# Patient Record
Sex: MALE | Race: WHITE | NOT HISPANIC OR LATINO | Employment: OTHER | URBAN - METROPOLITAN AREA
[De-identification: names, ages, dates, MRNs, and addresses within clinical notes are randomized per-mention and may not be internally consistent; named-entity substitution may affect disease eponyms.]

---

## 2021-04-12 ENCOUNTER — HOSPITAL ENCOUNTER (EMERGENCY)
Facility: HOSPITAL | Age: 61
End: 2021-04-12
Attending: EMERGENCY MEDICINE
Payer: COMMERCIAL

## 2021-04-12 VITALS
HEART RATE: 58 BPM | RESPIRATION RATE: 19 BRPM | OXYGEN SATURATION: 95 % | DIASTOLIC BLOOD PRESSURE: 95 MMHG | TEMPERATURE: 98 F | SYSTOLIC BLOOD PRESSURE: 145 MMHG | WEIGHT: 240.31 LBS

## 2021-04-12 DIAGNOSIS — S12.601A CLOSED NONDISPLACED FRACTURE OF SEVENTH CERVICAL VERTEBRA, UNSPECIFIED FRACTURE MORPHOLOGY, INITIAL ENCOUNTER: ICD-10-CM

## 2021-04-12 DIAGNOSIS — S01.01XA LACERATION OF SCALP, INITIAL ENCOUNTER: ICD-10-CM

## 2021-04-12 DIAGNOSIS — S20.219A CONTUSION OF CHEST WALL, UNSPECIFIED LATERALITY, INITIAL ENCOUNTER: ICD-10-CM
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
## 2021-04-12 DIAGNOSIS — G93.89 PNEUMOCEPHALUS: Primary | ICD-10-CM

## 2021-04-12 DIAGNOSIS — H91.91 HEARING LOSS OF RIGHT EAR, UNSPECIFIED HEARING LOSS TYPE: ICD-10-CM

## 2021-04-12 LAB
ALBUMIN SERPL BCP-MCNC: 3.2 G/DL (ref 3.5–5.2)
ALP SERPL-CCNC: 45 U/L (ref 55–135)
ALT SERPL W/O P-5'-P-CCNC: 34 U/L (ref 10–44)
ANION GAP SERPL CALC-SCNC: 8 MMOL/L (ref 8–16)
APTT BLDCRRT: 24.8 SEC (ref 21–32)
AST SERPL-CCNC: 25 U/L (ref 10–40)
BASOPHILS # BLD AUTO: 0.03 K/UL (ref 0–0.2)
BASOPHILS NFR BLD: 0.4 % (ref 0–1.9)
BILIRUB SERPL-MCNC: 0.6 MG/DL (ref 0.1–1)
BUN SERPL-MCNC: 24 MG/DL (ref 8–23)
CALCIUM SERPL-MCNC: 8.6 MG/DL (ref 8.7–10.5)
CHLORIDE SERPL-SCNC: 106 MMOL/L (ref 95–110)
CK SERPL-CCNC: 394 U/L (ref 20–200)
CO2 SERPL-SCNC: 26 MMOL/L (ref 23–29)
CREAT SERPL-MCNC: 1.1 MG/DL (ref 0.5–1.4)
CTP QC/QA: YES
DIFFERENTIAL METHOD: NORMAL
EOSINOPHIL # BLD AUTO: 0.1 K/UL (ref 0–0.5)
EOSINOPHIL NFR BLD: 2 % (ref 0–8)
ERYTHROCYTE [DISTWIDTH] IN BLOOD BY AUTOMATED COUNT: 13.1 % (ref 11.5–14.5)
EST. GFR  (AFRICAN AMERICAN): >60 ML/MIN/1.73 M^2
EST. GFR  (NON AFRICAN AMERICAN): >60 ML/MIN/1.73 M^2
GLUCOSE SERPL-MCNC: 123 MG/DL (ref 70–110)
HCT VFR BLD AUTO: 50.5 % (ref 40–54)
HGB BLD-MCNC: 16.6 G/DL (ref 14–18)
IMM GRANULOCYTES # BLD AUTO: 0.02 K/UL (ref 0–0.04)
IMM GRANULOCYTES NFR BLD AUTO: 0.3 % (ref 0–0.5)
INR PPP: 1 (ref 0.8–1.2)
LYMPHOCYTES # BLD AUTO: 1.6 K/UL (ref 1–4.8)
LYMPHOCYTES NFR BLD: 23 % (ref 18–48)
MCH RBC QN AUTO: 30.4 PG (ref 27–31)
MCHC RBC AUTO-ENTMCNC: 32.9 G/DL (ref 32–36)
MCV RBC AUTO: 93 FL (ref 82–98)
MONOCYTES # BLD AUTO: 0.5 K/UL (ref 0.3–1)
MONOCYTES NFR BLD: 7.4 % (ref 4–15)
NEUTROPHILS # BLD AUTO: 4.7 K/UL (ref 1.8–7.7)
NEUTROPHILS NFR BLD: 66.9 % (ref 38–73)
NRBC BLD-RTO: 0 /100 WBC
PLATELET # BLD AUTO: 208 K/UL (ref 150–450)
PMV BLD AUTO: 9.6 FL (ref 9.2–12.9)
POTASSIUM SERPL-SCNC: 4.7 MMOL/L (ref 3.5–5.1)
PROT SERPL-MCNC: 6.5 G/DL (ref 6–8.4)
PROTHROMBIN TIME: 10.3 SEC (ref 9–12.5)
RBC # BLD AUTO: 5.46 M/UL (ref 4.6–6.2)
SARS-COV-2 RDRP RESP QL NAA+PROBE: NEGATIVE
SODIUM SERPL-SCNC: 140 MMOL/L (ref 136–145)
TROPONIN I SERPL DL<=0.01 NG/ML-MCNC: 0.02 NG/ML (ref 0–0.03)
WBC # BLD AUTO: 7.03 K/UL (ref 3.9–12.7)

## 2021-04-12 PROCEDURE — 80053 COMPREHEN METABOLIC PANEL: CPT | Mod: ER | Performed by: EMERGENCY MEDICINE

## 2021-04-12 PROCEDURE — 85025 COMPLETE CBC W/AUTO DIFF WBC: CPT | Mod: ER | Performed by: EMERGENCY MEDICINE

## 2021-04-12 PROCEDURE — 85730 THROMBOPLASTIN TIME PARTIAL: CPT | Mod: ER | Performed by: EMERGENCY MEDICINE

## 2021-04-12 PROCEDURE — 96374 THER/PROPH/DIAG INJ IV PUSH: CPT | Mod: ER

## 2021-04-12 PROCEDURE — 99291 CRITICAL CARE FIRST HOUR: CPT | Mod: 25,ER

## 2021-04-12 PROCEDURE — 85610 PROTHROMBIN TIME: CPT | Mod: ER | Performed by: EMERGENCY MEDICINE

## 2021-04-12 PROCEDURE — 84484 ASSAY OF TROPONIN QUANT: CPT | Mod: ER | Performed by: EMERGENCY MEDICINE

## 2021-04-12 PROCEDURE — 96361 HYDRATE IV INFUSION ADD-ON: CPT | Mod: ER

## 2021-04-12 PROCEDURE — U0002 COVID-19 LAB TEST NON-CDC: HCPCS | Mod: ER | Performed by: EMERGENCY MEDICINE

## 2021-04-12 PROCEDURE — 63600175 PHARM REV CODE 636 W HCPCS: Mod: ER | Performed by: EMERGENCY MEDICINE

## 2021-04-12 PROCEDURE — 82550 ASSAY OF CK (CPK): CPT | Mod: ER | Performed by: EMERGENCY MEDICINE

## 2021-04-12 RX ORDER — ONDANSETRON 2 MG/ML
4 INJECTION INTRAMUSCULAR; INTRAVENOUS
Status: COMPLETED | OUTPATIENT
Start: 2021-04-12 | End: 2021-04-12

## 2021-04-12 RX ORDER — EVOLOCUMAB 140 MG/ML
140 INJECTION, SOLUTION SUBCUTANEOUS
COMMUNITY
Start: 2021-03-24

## 2021-04-12 RX ORDER — IPRATROPIUM BROMIDE 21 UG/1
2 SPRAY, METERED NASAL 2 TIMES DAILY
COMMUNITY
Start: 2021-03-26

## 2021-04-12 RX ORDER — TADALAFIL 5 MG/1
5 TABLET ORAL NIGHTLY
COMMUNITY
Start: 2021-03-24

## 2021-04-12 RX ORDER — OMEPRAZOLE 20 MG/1
20 CAPSULE, DELAYED RELEASE ORAL DAILY
COMMUNITY
Start: 2021-03-24

## 2021-04-12 RX ORDER — LISINOPRIL AND HYDROCHLOROTHIAZIDE 12.5; 2 MG/1; MG/1
1 TABLET ORAL
COMMUNITY
End: 2021-04-12 | Stop reason: CLARIF

## 2021-04-12 RX ORDER — HYDROCODONE BITARTRATE AND ACETAMINOPHEN 10; 325 MG/1; MG/1
1 TABLET ORAL 3 TIMES DAILY PRN
COMMUNITY
Start: 2021-04-11

## 2021-04-12 RX ADMIN — SODIUM CHLORIDE, SODIUM LACTATE, POTASSIUM CHLORIDE, AND CALCIUM CHLORIDE 1000 ML: .6; .31; .03; .02 INJECTION, SOLUTION INTRAVENOUS at 10:04

## 2021-04-12 RX ADMIN — ONDANSETRON 4 MG: 2 INJECTION INTRAMUSCULAR; INTRAVENOUS at 11:04

## 2021-09-30 ENCOUNTER — HOSPITAL ENCOUNTER (EMERGENCY)
Facility: HOSPITAL | Age: 61
Discharge: HOME | End: 2021-09-30
Attending: EMERGENCY MEDICINE
Payer: COMMERCIAL

## 2021-09-30 VITALS
RESPIRATION RATE: 28 BRPM | DIASTOLIC BLOOD PRESSURE: 86 MMHG | WEIGHT: 245.81 LBS | HEART RATE: 62 BPM | TEMPERATURE: 99 F | OXYGEN SATURATION: 96 % | SYSTOLIC BLOOD PRESSURE: 130 MMHG

## 2021-09-30 DIAGNOSIS — R06.02 SOB (SHORTNESS OF BREATH): ICD-10-CM

## 2021-09-30 DIAGNOSIS — R60.9 DEPENDENT EDEMA: Primary | ICD-10-CM

## 2021-09-30 LAB
ALBUMIN SERPL BCP-MCNC: 3.2 G/DL (ref 3.5–5.2)
ALP SERPL-CCNC: 48 U/L (ref 55–135)
ALT SERPL W/O P-5'-P-CCNC: 88 U/L (ref 10–44)
ANION GAP SERPL CALC-SCNC: 11 MMOL/L (ref 8–16)
AST SERPL-CCNC: 45 U/L (ref 10–40)
BASOPHILS # BLD AUTO: 0.05 K/UL (ref 0–0.2)
BASOPHILS NFR BLD: 0.7 % (ref 0–1.9)
BILIRUB SERPL-MCNC: 0.8 MG/DL (ref 0.1–1)
BILIRUB UR QL STRIP: NEGATIVE
BNP SERPL-MCNC: 80 PG/ML (ref 0–99)
BUN SERPL-MCNC: 21 MG/DL (ref 8–23)
CALCIUM SERPL-MCNC: 9.1 MG/DL (ref 8.7–10.5)
CHLORIDE SERPL-SCNC: 104 MMOL/L (ref 95–110)
CK SERPL-CCNC: 199 U/L (ref 20–200)
CLARITY UR REFRACT.AUTO: CLEAR
CO2 SERPL-SCNC: 26 MMOL/L (ref 23–29)
COLOR UR AUTO: YELLOW
CREAT SERPL-MCNC: 1.3 MG/DL (ref 0.5–1.4)
DIFFERENTIAL METHOD: NORMAL
EOSINOPHIL # BLD AUTO: 0.3 K/UL (ref 0–0.5)
EOSINOPHIL NFR BLD: 3.7 % (ref 0–8)
ERYTHROCYTE [DISTWIDTH] IN BLOOD BY AUTOMATED COUNT: 13.1 % (ref 11.5–14.5)
EST. GFR  (AFRICAN AMERICAN): >60 ML/MIN/1.73 M^2
EST. GFR  (NON AFRICAN AMERICAN): 58.1 ML/MIN/1.73 M^2
GLUCOSE SERPL-MCNC: 99 MG/DL (ref 70–110)
GLUCOSE UR QL STRIP: NEGATIVE
HCT VFR BLD AUTO: 53.7 % (ref 40–54)
HGB BLD-MCNC: 18 G/DL (ref 14–18)
HGB UR QL STRIP: NEGATIVE
IMM GRANULOCYTES # BLD AUTO: 0.03 K/UL (ref 0–0.04)
IMM GRANULOCYTES NFR BLD AUTO: 0.4 % (ref 0–0.5)
KETONES UR QL STRIP: NEGATIVE
LEUKOCYTE ESTERASE UR QL STRIP: NEGATIVE
LYMPHOCYTES # BLD AUTO: 2.2 K/UL (ref 1–4.8)
LYMPHOCYTES NFR BLD: 32.5 % (ref 18–48)
MCH RBC QN AUTO: 30.2 PG (ref 27–31)
MCHC RBC AUTO-ENTMCNC: 33.5 G/DL (ref 32–36)
MCV RBC AUTO: 90 FL (ref 82–98)
MONOCYTES # BLD AUTO: 0.6 K/UL (ref 0.3–1)
MONOCYTES NFR BLD: 8.8 % (ref 4–15)
NEUTROPHILS # BLD AUTO: 3.6 K/UL (ref 1.8–7.7)
NEUTROPHILS NFR BLD: 53.9 % (ref 38–73)
NITRITE UR QL STRIP: NEGATIVE
NRBC BLD-RTO: 0 /100 WBC
PH UR STRIP: 6 [PH] (ref 5–8)
PLATELET # BLD AUTO: 221 K/UL (ref 150–450)
PMV BLD AUTO: 9.6 FL (ref 9.2–12.9)
POTASSIUM SERPL-SCNC: 4.7 MMOL/L (ref 3.5–5.1)
PROT SERPL-MCNC: 7 G/DL (ref 6–8.4)
PROT UR QL STRIP: NEGATIVE
RBC # BLD AUTO: 5.97 M/UL (ref 4.6–6.2)
SODIUM SERPL-SCNC: 141 MMOL/L (ref 136–145)
SP GR UR STRIP: <=1.005 (ref 1–1.03)
TROPONIN I SERPL DL<=0.01 NG/ML-MCNC: 0.01 NG/ML (ref 0–0.03)
URN SPEC COLLECT METH UR: ABNORMAL
UROBILINOGEN UR STRIP-ACNC: NEGATIVE EU/DL
WBC # BLD AUTO: 6.7 K/UL (ref 3.9–12.7)

## 2021-09-30 PROCEDURE — 80053 COMPREHEN METABOLIC PANEL: CPT | Mod: ER | Performed by: EMERGENCY MEDICINE

## 2021-09-30 PROCEDURE — 93005 ELECTROCARDIOGRAM TRACING: CPT | Mod: ER

## 2021-09-30 PROCEDURE — 93010 EKG 12-LEAD: ICD-10-PCS | Mod: ,,, | Performed by: INTERNAL MEDICINE

## 2021-09-30 PROCEDURE — 81003 URINALYSIS AUTO W/O SCOPE: CPT | Mod: ER | Performed by: EMERGENCY MEDICINE

## 2021-09-30 PROCEDURE — 99285 EMERGENCY DEPT VISIT HI MDM: CPT | Mod: 25,ER

## 2021-09-30 PROCEDURE — 84484 ASSAY OF TROPONIN QUANT: CPT | Mod: ER | Performed by: EMERGENCY MEDICINE

## 2021-09-30 PROCEDURE — 93010 ELECTROCARDIOGRAM REPORT: CPT | Mod: ,,, | Performed by: INTERNAL MEDICINE

## 2021-09-30 PROCEDURE — 82550 ASSAY OF CK (CPK): CPT | Mod: ER | Performed by: EMERGENCY MEDICINE

## 2021-09-30 PROCEDURE — 83880 ASSAY OF NATRIURETIC PEPTIDE: CPT | Mod: ER | Performed by: EMERGENCY MEDICINE

## 2021-09-30 PROCEDURE — 25500020 PHARM REV CODE 255: Mod: ER | Performed by: EMERGENCY MEDICINE

## 2021-09-30 PROCEDURE — 85025 COMPLETE CBC W/AUTO DIFF WBC: CPT | Mod: ER | Performed by: EMERGENCY MEDICINE

## 2021-09-30 RX ORDER — ROSUVASTATIN CALCIUM 40 MG/1
10 TABLET, COATED ORAL NIGHTLY
COMMUNITY

## 2021-09-30 RX ADMIN — IOHEXOL 100 ML: 350 INJECTION, SOLUTION INTRAVENOUS at 06:09

## 2024-04-17 ENCOUNTER — OFFICE VISIT (OUTPATIENT)
Dept: OPHTHALMOLOGY | Facility: CLINIC | Age: 64
End: 2024-04-17
Payer: MEDICARE

## 2024-04-17 DIAGNOSIS — G43.109 OCULAR MIGRAINE: ICD-10-CM

## 2024-04-17 DIAGNOSIS — H52.13 MYOPIA WITH PRESBYOPIA, BILATERAL: ICD-10-CM

## 2024-04-17 DIAGNOSIS — H25.13 NUCLEAR SCLEROSIS, BILATERAL: Primary | ICD-10-CM

## 2024-04-17 DIAGNOSIS — H53.10 SUBJECTIVE VISUAL DISTURBANCE: ICD-10-CM

## 2024-04-17 DIAGNOSIS — H52.221 REGULAR ASTIGMATISM OF RIGHT EYE: ICD-10-CM

## 2024-04-17 DIAGNOSIS — H52.4 MYOPIA WITH PRESBYOPIA, BILATERAL: ICD-10-CM

## 2024-04-17 DIAGNOSIS — Z98.890 HX OF LASIK: ICD-10-CM

## 2024-04-17 PROCEDURE — 92004 COMPRE OPH EXAM NEW PT 1/>: CPT | Mod: S$GLB,,, | Performed by: OPTOMETRIST

## 2024-04-17 PROCEDURE — 99999 PR PBB SHADOW E&M-EST. PATIENT-LVL II: CPT | Mod: PBBFAC,,, | Performed by: OPTOMETRIST

## 2024-04-17 PROCEDURE — 1159F MED LIST DOCD IN RCRD: CPT | Mod: CPTII,S$GLB,, | Performed by: OPTOMETRIST

## 2024-04-17 PROCEDURE — 92015 DETERMINE REFRACTIVE STATE: CPT | Mod: S$GLB,,, | Performed by: OPTOMETRIST

## 2024-04-17 PROCEDURE — 1160F RVW MEDS BY RX/DR IN RCRD: CPT | Mod: CPTII,S$GLB,, | Performed by: OPTOMETRIST
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
## 2024-05-06 ENCOUNTER — OFFICE VISIT (OUTPATIENT)
Dept: OPHTHALMOLOGY | Facility: CLINIC | Age: 64
End: 2024-05-06
Payer: MEDICARE

## 2024-05-06 DIAGNOSIS — G43.109 OCULAR MIGRAINE: Primary | ICD-10-CM

## 2024-05-06 DIAGNOSIS — H53.10 SUBJECTIVE VISUAL DISTURBANCE: ICD-10-CM

## 2024-05-06 PROCEDURE — 92083 EXTENDED VISUAL FIELD XM: CPT | Mod: S$GLB,,, | Performed by: OPTOMETRIST

## 2024-05-06 PROCEDURE — 99499 UNLISTED E&M SERVICE: CPT | Mod: S$GLB,,, | Performed by: OPTOMETRIST

## 2024-05-06 PROCEDURE — 99999 PR PBB SHADOW E&M-EST. PATIENT-LVL II: CPT | Mod: PBBFAC,,, | Performed by: OPTOMETRIST

## 2024-05-06 NOTE — PROGRESS NOTES
HPI     Follow-up            Comments: RTC next available for 24-2 HVF  Pt states no change in VA         Last edited by Holli Gil on 5/6/2024 10:33 AM.        no aura or new symptoms since last visit    Assessment /Plan     For exam results, see Encounter Report.    Ocular migraine  -     Toth Visual Field - OU - Extended - Both Eyes    Subjective visual disturbance  -     Toth Visual Field - OU - Extended - Both Eyes      HVF normal with good reliability   Discussed Neurology referral if symptoms increase in freq     RTC 1 yr for dilated eye exam or PRN if any problems.   Discussed above and answered questions.

## 2024-06-11 DIAGNOSIS — Z00.6 RESEARCH STUDY PATIENT: Primary | ICD-10-CM

## 2024-06-13 ENCOUNTER — RESEARCH ENCOUNTER (OUTPATIENT)
Dept: RESEARCH | Facility: HOSPITAL | Age: 64
End: 2024-06-13
Payer: MEDICARE

## 2024-06-13 ENCOUNTER — LAB VISIT (OUTPATIENT)
Dept: LAB | Facility: HOSPITAL | Age: 64
End: 2024-06-13
Attending: INTERNAL MEDICINE
Payer: MEDICARE

## 2024-06-13 DIAGNOSIS — Z00.6 RESEARCH STUDY PATIENT: ICD-10-CM

## 2024-06-13 LAB
DRUG STUDY TEST NAME: NORMAL
DRUG STUDY TEST RESULT: NORMAL

## 2024-06-13 PROCEDURE — 36415 COLL VENOUS BLD VENIPUNCTURE: CPT | Performed by: INTERNAL MEDICINE

## 2024-06-13 NOTE — RESEARCH
Sponsor: [employer]     Study Title/IRB Number: [id number]    Principle Investigator: Dr. Benson Ramirez    Patient eligibility was checked prior to enrollment in the study. Patient met the following inclusion and exclusion criteria:     INCLUSION CRITERIA  Participant age is 50 years or older at the time of signing the Informed Consent form  Participant is capable of giving signed Informed Consent, which includes compliance with the requirements and restrictions in the informed consent form and the protocol    EXCLUSION CRITERIA  Participant is undergoing or referred for diagnostic evaluation due to clinical suspicion for cancer  Participant has a personal history of invasive or hematologic malignancy, diagnosed within the last 3 years prior to expected enrollment date or diagnosed greater than 3 years prior to expected enrollment date and never treated  Participant has had definitive treatment for invasive or hematologic malignancy within the 3 years prior to expected enrollment date  Participant is not able to comply with protocol procedures  Participant is not a current patient at a participating center  Participant is currently enrolled or was previously enrolled in another [employer]-sponsored study  Participant is current or previous employee/contractor of [employer]  Participant is currently pregnant (by participant's self-report of pregnancy status)    Prior to the Informed Consent (IC) being signed, or any study protocol required data collection, testing, procedure, or intervention being performed, the following was done and/or discussed:  Patient was given a copy of the IC for review   Purpose of the study and qualifications to participate   Study design, Follow up schedule, and tests or procedures done at each visit  Confidentiality and HIPAA Authorization for Release of Medical Records for the research trial/ subject's rights/research related injury  Risk, Benefits, Alternative Treatments, Compensation and  Costs  Participation in the research trial is voluntary and patient may withdraw at anytime  Contact information for study related questions    Patient verbalizes understanding of the above: Yes  Contact information for CRC and PI given to patient: Yes  Patient able to adequately summarize: the purpose of the study, the risks associated with the study, and all procedures, testing, and follow-ups associated with the study: Yes    Patient signed the informed consent form for the research study with an IRB approval date of 4/25/2022. Each page of the consent form was reviewed with patient and all questions answered satisfactorily.   Patient received a copy of the consent form. The original consent was scanned into electronic medical records.    Anthropometric Data    Participant is a 63 y.o., White, Not  or /a, male  Participant height: [id number]     Participant weight: 228      Smoking History and Alcohol use     Please indicate whether the participant smoked at least 100 cigarettes in their lifetime:   Yes  Please indicate whether the participant is a current smoker:  No  Age participant started smoking cigarettes:  16  Age participant stopped smoking cigarettes:  18  During their time as a smoker, please indicate if the participant stopped smoking for a month or more: Not Applicable  Please indicate how many cigarettes per day did the participant smoke on average for the majority of their time as a smoker: Blank single: 20    During the last 12 months, how often did the participant have any kind of drink containing alcohol? Count as one drink a 12 ounce can or glass of beer, a 5 ounce glass of wine, or a drink containing 1 shot of liquid-3  During the last 12 months, how many drinks did the participant have on days when they drank alcohol?2 drinks per day    Blood Draw    Did the participant experience any abnormal signs or symptoms in the 90 day prior to today's blood draw?  No    Abdominal pain  No  Abnormal discharge from nipple No  Abnormal vaginal bleeding or discharge No  Abnormal; heavy or irregular menstruation; spotting No  Abnormal findings of blood chemistry/plasma proteins No  Abnormal immunological findings in serum No  Abnormal serum enzyme levels No  Anemia; iron deficiency anemia No  Blood clot/venous thromboembolism No  Bone pain No  Breast lump No  Changes in bowel habits or bladder function No  Nausea and vomiting No  Severe or recurrent nosebleeds No  Swelling of lymph nodes/enlarged lymph nodes No  Trouble swallowing (dysphagia) No  Ulcers No  Unusual bleeding (stool or urine) No  Unexplained weight loss (cachexia) No  Other N/A    Following IC being signed and prior to blood draw, patient completed all baseline/pretest questionnaires. The following specimens were collected from the pt at the time of this encounter via peripheral blood draw.    Blood draw location: Left Arm  Needle used: 21 gauge butterfly needle  Blood draw amount: 40ml  Blood draw time: 1115 6/13/2024

## 2024-06-28 ENCOUNTER — TELEPHONE (OUTPATIENT)
Dept: RESEARCH | Facility: HOSPITAL | Age: 64
End: 2024-06-28
Payer: MEDICARE

## 2024-06-28 NOTE — TELEPHONE ENCOUNTER
HATTIEIL Patel YOUNG ID-[id number]     Subject called to report results for HATTIEIL Patel Research Project. No answer,   [phone]    message left for subject to call the coordinator back at [phone]. This is the 1st attempt to contact subject.

## 2024-06-28 NOTE — TELEPHONE ENCOUNTER
Maddie Pathfinder 2022.003    Maddie ID: [id number]      Results the Maddie [facility] Multi Cancer Early Detection test and were reviewed by PI Dr Ramirez. Patient was called and notified of test results, there was no signal detected.    Patient reminded, this was a screening test, so we still highly encourage them to continue other normal health screenings  and to continue to adhere to guideline-recommended cancer screening.    Patient was asked about completing 30 day questionnaire online and was agreeable.

## 2024-11-18 ENCOUNTER — OFFICE VISIT (OUTPATIENT)
Facility: CLINIC | Age: 64
End: 2024-11-18
Payer: MEDICARE

## 2024-11-18 ENCOUNTER — LAB VISIT (OUTPATIENT)
Dept: LAB | Facility: HOSPITAL | Age: 64
End: 2024-11-18
Attending: UROLOGY
Payer: MEDICARE

## 2024-11-18 VITALS
WEIGHT: 240 LBS | RESPIRATION RATE: 16 BRPM | HEART RATE: 56 BPM | SYSTOLIC BLOOD PRESSURE: 120 MMHG | HEIGHT: 68 IN | BODY MASS INDEX: 36.37 KG/M2 | DIASTOLIC BLOOD PRESSURE: 81 MMHG

## 2024-11-18 DIAGNOSIS — R53.83 FATIGUE, UNSPECIFIED TYPE: ICD-10-CM

## 2024-11-18 DIAGNOSIS — Z12.5 PROSTATE CANCER SCREENING: ICD-10-CM

## 2024-11-18 DIAGNOSIS — N40.0 BPH WITHOUT URINARY OBSTRUCTION: Primary | ICD-10-CM

## 2024-11-18 DIAGNOSIS — N52.8 OTHER MALE ERECTILE DYSFUNCTION: ICD-10-CM

## 2024-11-18 DIAGNOSIS — N40.0 BPH WITHOUT URINARY OBSTRUCTION: ICD-10-CM

## 2024-11-18 LAB
COMPLEXED PSA SERPL-MCNC: 0.58 NG/ML (ref 0–4)
TESTOST SERPL-MCNC: 402 NG/DL (ref 304–1227)

## 2024-11-18 PROCEDURE — 99214 OFFICE O/P EST MOD 30 MIN: CPT | Mod: S$GLB,,, | Performed by: UROLOGY

## 2024-11-18 PROCEDURE — 1101F PT FALLS ASSESS-DOCD LE1/YR: CPT | Mod: CPTII,S$GLB,, | Performed by: UROLOGY

## 2024-11-18 PROCEDURE — 3074F SYST BP LT 130 MM HG: CPT | Mod: CPTII,S$GLB,, | Performed by: UROLOGY

## 2024-11-18 PROCEDURE — 1126F AMNT PAIN NOTED NONE PRSNT: CPT | Mod: CPTII,S$GLB,, | Performed by: UROLOGY

## 2024-11-18 PROCEDURE — 1159F MED LIST DOCD IN RCRD: CPT | Mod: CPTII,S$GLB,, | Performed by: UROLOGY

## 2024-11-18 PROCEDURE — 84153 ASSAY OF PSA TOTAL: CPT | Performed by: UROLOGY

## 2024-11-18 PROCEDURE — 99999 PR PBB SHADOW E&M-EST. PATIENT-LVL III: CPT | Mod: PBBFAC,,, | Performed by: UROLOGY

## 2024-11-18 PROCEDURE — 36415 COLL VENOUS BLD VENIPUNCTURE: CPT | Mod: PO | Performed by: UROLOGY

## 2024-11-18 PROCEDURE — 3008F BODY MASS INDEX DOCD: CPT | Mod: CPTII,S$GLB,, | Performed by: UROLOGY

## 2024-11-18 PROCEDURE — 3288F FALL RISK ASSESSMENT DOCD: CPT | Mod: CPTII,S$GLB,, | Performed by: UROLOGY

## 2024-11-18 PROCEDURE — 84403 ASSAY OF TOTAL TESTOSTERONE: CPT | Performed by: UROLOGY

## 2024-11-18 PROCEDURE — 3079F DIAST BP 80-89 MM HG: CPT | Mod: CPTII,S$GLB,, | Performed by: UROLOGY

## 2024-11-18 RX ORDER — TADALAFIL 5 MG/1
5 TABLET ORAL DAILY
Qty: 90 TABLET | Refills: 3 | Status: SHIPPED | OUTPATIENT
Start: 2024-11-18 | End: 2025-11-18

## 2024-11-18 NOTE — PROGRESS NOTES
Subjective:       Patient ID: Romulo Ortiz is a 64 y.o. male.    Chief Complaint: Follow-up      History of Present Illness:     Mr Ortiz is here today with his wife.  He has BPH without LUTS on tadalafil 5 mg daily.  Normal urinary flow.  He feels that he is emptying his bladder.  He stopped taking tamsulosin in the past due to it causing muscle cramps mostly in his pelvic area.  He has some problems with fatigue.  He has a history of hypogonadism.  He was on testosterone injections in 2011.  He received a testosterone injection on 8-8-14.  He has mild ED and tadalafil 5 mg daily helps with his erections.         History reviewed. No pertinent past medical history.  No family history on file.  Social History     Socioeconomic History    Marital status:    Tobacco Use    Smoking status: Never    Smokeless tobacco: Never     Outpatient Encounter Medications as of 11/18/2024   Medication Sig Dispense Refill    HYDROcodone-acetaminophen (NORCO)  mg per tablet Take 1 tablet by mouth 3 (three) times daily as needed.      ipratropium (ATROVENT) 0.03 % nasal spray 2 sprays 2 (two) times daily.      omeprazole (PRILOSEC) 20 MG capsule Take 20 mg by mouth once daily.      REPATHA SURECLICK 140 mg/mL PnIj Inject 140 mg into the skin every 14 (fourteen) days.      rosuvastatin (CRESTOR) 40 MG Tab Take 10 mg by mouth every evening.      tadalafiL (CIALIS) 5 MG tablet Take 5 mg by mouth every evening.      tadalafiL (CIALIS) 5 MG tablet Take 1 tablet (5 mg total) by mouth once daily. 90 tablet 3     No facility-administered encounter medications on file as of 11/18/2024.        Review of Systems   Constitutional:  Negative for chills and fever.   Respiratory:  Negative for shortness of breath.    Cardiovascular:  Negative for chest pain.   Gastrointestinal:  Negative for nausea and vomiting.   Genitourinary:  Negative for difficulty urinating, dysuria and hematuria.   Musculoskeletal:  Negative for back  "pain.   Skin:  Negative for rash.   Neurological:  Negative for dizziness.   Psychiatric/Behavioral:  Negative for agitation.        Objective:     /81 (Patient Position: Sitting)   Pulse (!) 56   Resp 16   Ht 5' 8" (1.727 m)   Wt 108.9 kg (240 lb)   BMI 36.49 kg/m²     Physical Exam  Constitutional:       Appearance: Normal appearance.   Pulmonary:      Effort: Pulmonary effort is normal.   Abdominal:      Palpations: Abdomen is soft.   Genitourinary:     Comments: Prostate exam deferred today.  Neurological:      Mental Status: He is alert and oriented to person, place, and time.   Psychiatric:         Mood and Affect: Mood normal.         No visits with results within 1 Day(s) from this visit.   Latest known visit with results is:   Lab Visit on 06/13/2024   Component Date Value Ref Range Status    Drug Study Test Name 06/13/2024 Drug Study   Final    Drug Study Test Result 06/13/2024 Result sent directly to physician   Final        No results found for this or any previous visit (from the past 8760 hours).     Assessment:       1. BPH without urinary obstruction    2. Fatigue, unspecified type    3. Other male erectile dysfunction    4. Prostate cancer screening      Plan:     Orders Placed This Encounter    PROSTATE SPECIFIC ANTIGEN, DIAGNOSTIC    TESTOSTERONE    tadalafiL (CIALIS) 5 MG tablet        11-30-23  PSA 0.860    11-28-22  PSA 0.71    11-24-21  PSA 1.81    8-13-20  PSA 0.87    9-10-24  Cr 1.30.  GFR 61    7-2-24  HgbA1c 5.3    11-14-23  Total testosterone 716    11-24-21  Total testosterone 497    11-9-23  Urine atypical culture.  Negative.    3-27-19  Urine atypical culture.  Negative.    9-28-18  Urine atypical culture.  Negative.    I reviewed the above lab results.         Assessment:  - BPH without LUTS on tadalafil 5 mg daily.  He stopped taking tamsulosin in the past due to it causing muscle cramps mostly in his pelvic area.  - Fatigue.  History of hypogonadism.  He was on " testosterone injections in 2011.  He received a testosterone injection on 8-8-14.  - Mild ED.  Tadalafil 5 mg daily helps with his erections.  - History of a few episodes of prostatitis in the past.  He was last treated for prostatitis in Nov 2021 with a 2 week course of ciprofloxacin.    Plan:  - PSA and total testosterone this morning.  - Refilled tadalafil 5 mg 1 PO qdaily, disp #90, 3 refills to [facility] in [place] today on 11-18-24.  He was given a [employer] coupon card today on 11-18-24.  - RTC in 1 year.

## 2025-07-11 ENCOUNTER — TELEPHONE (OUTPATIENT)
Dept: RESEARCH | Facility: HOSPITAL | Age: 65
End: 2025-07-11

## 2025-07-21 ENCOUNTER — TELEPHONE (OUTPATIENT)
Dept: UROLOGY | Facility: CLINIC | Age: 65
End: 2025-07-21
Payer: MEDICARE

## 2025-07-21 NOTE — TELEPHONE ENCOUNTER
.Outgoing call attempted to contact patient regarding below but no answer, left voice message with contact information letting patient know he can contact us at his earliest convenience and encouraged to go to ED if he needs immediate evaluation and treatment.      Copied from CRM #[id number]. Topic: General Inquiry - Patient Advice  >> Jul 21, 2025  9:53 AM Tawana wrote:  Type:  Needs Medical Advice    Who Called:  pt  Symptoms (please be specific):  pt went to  for a UTI and was given medication, but is still feeling some discomfort.  Pt was Cefpodoxime 200 mg.  Pt would like to know if this is ok to continue or shld he come in to see .  How long has patient had these symptoms:   Wednesday am  Pharmacy name and phone #:     Would the patient rather a call back or a response via [facility]?  phone  Best Call Back Number:  [phone]   Additional Information:

## 2025-07-22 ENCOUNTER — PATIENT MESSAGE (OUTPATIENT)
Facility: CLINIC | Age: 65
End: 2025-07-22
Payer: MEDICARE

## 2025-07-22 ENCOUNTER — TELEPHONE (OUTPATIENT)
Dept: UROLOGY | Facility: CLINIC | Age: 65
End: 2025-07-22
Payer: MEDICARE

## 2025-07-22 NOTE — TELEPHONE ENCOUNTER
.Outgoing call placed to patient, patient verified identifiers, patient confirmed below, this nurse verbalized understanding and notified him that Dr. Krause is not in clinic on today but we could get him seen tomorrow, he verbalized understanding and agreed, appt scheduled and encouraged patient to go to ED if his symptoms worsen before his appt tomorrow, he verbalized understanding.          Last Wednesday I was experiencing pain when urinating. By Thursday I had started running fever, so I went to a local urgent care Thursday evening. I was told I had a UTI, with 100.9 fever and was prescribedCefpodoxine 200 my. It wasn't until Sunday that my fever broke. I've been trying to contact Dr. Krause to discuss what im feeling and to see if he needs to see me. I have tried to schedule an appointment and the earliest is September 29, NOT HAPPENING, im not waiting 2 months to see my doctorfor something happening now. I need to speak with my doctor to see what he recommends. My number is [phone]   Thank you  Romulo Ortiz

## 2025-07-23 ENCOUNTER — OFFICE VISIT (OUTPATIENT)
Dept: UROLOGY | Facility: CLINIC | Age: 65
End: 2025-07-23
Payer: MEDICARE

## 2025-07-23 VITALS
WEIGHT: 238.13 LBS | BODY MASS INDEX: 36.09 KG/M2 | SYSTOLIC BLOOD PRESSURE: 121 MMHG | DIASTOLIC BLOOD PRESSURE: 82 MMHG | HEIGHT: 68 IN | HEART RATE: 69 BPM

## 2025-07-23 DIAGNOSIS — R35.1 NOCTURIA: ICD-10-CM

## 2025-07-23 DIAGNOSIS — N40.1 BENIGN LOCALIZED PROSTATIC HYPERPLASIA WITH LOWER URINARY TRACT SYMPTOMS (LUTS): ICD-10-CM

## 2025-07-23 DIAGNOSIS — N41.0 ACUTE PROSTATITIS: Primary | ICD-10-CM

## 2025-07-23 PROCEDURE — 3079F DIAST BP 80-89 MM HG: CPT | Mod: CPTII,S$GLB,, | Performed by: UROLOGY

## 2025-07-23 PROCEDURE — 1101F PT FALLS ASSESS-DOCD LE1/YR: CPT | Mod: CPTII,S$GLB,, | Performed by: UROLOGY

## 2025-07-23 PROCEDURE — 99999 PR PBB SHADOW E&M-EST. PATIENT-LVL III: CPT | Mod: PBBFAC,,, | Performed by: UROLOGY

## 2025-07-23 PROCEDURE — 1126F AMNT PAIN NOTED NONE PRSNT: CPT | Mod: CPTII,S$GLB,, | Performed by: UROLOGY

## 2025-07-23 PROCEDURE — 3288F FALL RISK ASSESSMENT DOCD: CPT | Mod: CPTII,S$GLB,, | Performed by: UROLOGY

## 2025-07-23 PROCEDURE — 4010F ACE/ARB THERAPY RXD/TAKEN: CPT | Mod: CPTII,S$GLB,, | Performed by: UROLOGY

## 2025-07-23 PROCEDURE — 1159F MED LIST DOCD IN RCRD: CPT | Mod: CPTII,S$GLB,, | Performed by: UROLOGY

## 2025-07-23 PROCEDURE — 3008F BODY MASS INDEX DOCD: CPT | Mod: CPTII,S$GLB,, | Performed by: UROLOGY

## 2025-07-23 PROCEDURE — 3074F SYST BP LT 130 MM HG: CPT | Mod: CPTII,S$GLB,, | Performed by: UROLOGY

## 2025-07-23 PROCEDURE — 99215 OFFICE O/P EST HI 40 MIN: CPT | Mod: S$GLB,,, | Performed by: UROLOGY

## 2025-07-23 RX ORDER — CIPROFLOXACIN 500 MG/1
500 TABLET, FILM COATED ORAL 2 TIMES DAILY
Qty: 42 TABLET | Refills: 0 | Status: SHIPPED | OUTPATIENT
Start: 2025-07-23

## 2025-07-23 RX ORDER — ALFUZOSIN HYDROCHLORIDE 10 MG/1
10 TABLET, EXTENDED RELEASE ORAL NIGHTLY
Qty: 90 TABLET | Refills: 1 | Status: SHIPPED | OUTPATIENT
Start: 2025-07-23 | End: 2026-07-23

## 2025-07-23 NOTE — PROGRESS NOTES
"Subjective:       Patient ID: Romulo Ortiz is a 65 y.o. male.    Chief Complaint: Follow-up      History of Present Illness:     Mr Ortiz says that he had a temp up to 100.9, mild dysuria, feelings of incomplete bladder emptying, and a slow urinary stream.  He says he went to an Urgent Care on 7-17-25 and he was prescribed a 10 day course of cefpodoxime 200 mg 1 PO BID which he says he is still taking.  He says that his urine culture on 7-17-25 was no growth. He says his fever stopped on 7-20-25.  His dysuria has resolved.  He says he recently had intercourse with his wife and he had he has pelvic and penile pain and he was unable to ejaculate.  He is still having worsening urinary symptoms from his baseline.  Nocturia X 2.  No gross hematuria.  He is not taking a prostate medication.  He stopped taking tamsulosin in the past due to it causing muscle cramps mostly in his pelvic area.  He says he is not taking tadalafil 5 mg daily and that he is taking tadalafil 5 mg or 10 mg as needed for ED which is working.  He has a history of a few episodes of prostatitis in the past.           History reviewed. No pertinent past medical history.  No family history on file.  Social History[1]  Encounter Medications[2]     Review of Systems   Constitutional:  Negative for chills and fever.   Respiratory:  Negative for shortness of breath.    Cardiovascular:  Negative for chest pain.   Gastrointestinal:  Negative for nausea and vomiting.   Musculoskeletal:  Negative for back pain.   Skin:  Negative for rash.   Neurological:  Negative for dizziness.   Psychiatric/Behavioral:  Negative for agitation.        Objective:     /82   Pulse 69   Ht 5' 8" (1.727 m)   Wt 108 kg (238 lb 1.6 oz)   BMI 36.20 kg/m²     Physical Exam  Constitutional:       Appearance: Normal appearance.   Pulmonary:      Effort: Pulmonary effort is normal.   Abdominal:      Palpations: Abdomen is soft.   Genitourinary:     Comments: Prostate " exam deferred  Neurological:      Mental Status: He is alert and oriented to person, place, and time.   Psychiatric:         Mood and Affect: Mood normal.         No visits with results within 1 Day(s) from this visit.   Latest known visit with results is:   Lab Visit on 11/18/2024   Component Date Value Ref Range Status    PSA Diagnostic 11/18/2024 0.58  0.00 - 4.00 ng/mL Final    Comment: The testing method is a chemiluminescent microparticle immunoassay   manufactured by [employer] and performed on the [employer]   or   [employer] system. Values obtained with different assay manufacturers   for   methods may be different and cannot be used interchangeably.  PSA Expected levels:  Hormonal Therapy: <0.05 ng/ml  Prostatectomy: <0.01 ng/ml  Radiation Therapy: <1.00 ng/ml      Testosterone, Total 11/18/2024 402  304 - 1227 ng/dL Final        No results found for this or any previous visit (from the past 8760 hours).     Assessment:       1. Acute prostatitis    2. Benign localized prostatic hyperplasia with lower urinary tract symptoms (LUTS)    3. Nocturia        Plan:     Orders Placed This Encounter    alfuzosin (UROXATRAL) 10 mg Tb24    ciprofloxacin HCl (CIPRO) 500 MG tablet        12-10-24  PSA 0.87    11-30-23  PSA 0.860     11-28-22  PSA 0.71     11-24-21  PSA 1.81     8-13-20  PSA 0.87    12-10-24  Cr 1.3.  GFR 61.     9-10-24  Cr 1.30.  GFR 61.     7-2-24  HgbA1c 5.3    11-18-24  Total testosterone 402     11-14-23  Total testosterone 716     11-24-21  Total testosterone 497     11-9-23  Urine atypical culture.  Negative.     3-27-19  Urine atypical culture.  Negative.     9-28-18  Urine atypical culture.  Negative.     I reviewed the above lab results.            Assessment:  - Acute prostatitis.  He says he had a temp up to 100.9, mild dysuria, feelings of incomplete bladder emptying, and a slow urinary stream.  He says he went to an Urgent Care on 7-17-25 and he was prescribed a 10 day course of  cefpodoxime 200 mg 1 PO BID which he says he is still taking.  He says that his urine culture on 7-17-25 was no growth.  UA today on 7-23-25 is normal.  He says his fever stopped on 7-20-25.  Dysuria has resolved.  He says he recently had intercourse with his wife and he had he has pelvic and penile pain and he was unable to ejaculate.  He is still having worsening urinary symptoms from his baseline.  - BPH with LUTS.  He is not taking a prostate medication.  He stopped taking tamsulosin in the past due to it causing muscle cramps mostly in his pelvic area.  - Nocturia.  - Mild ED.  Tadalafil 5 mg or 10 mg as needed is working for his erections.   - History of a few episodes of prostatitis in the past.       Plan:  - Stop cefpodoxime 200 mg 1 PO BID today on 7-23-25 that was started on 7-17-25.  - Started ciprofloxacin 500 mg 1 PO BID X 3 weeks.  - Started alfuzosin 10 mg 1 PO qhs today on 7-23-25.  - Refilled tadalafil 5 mg 1 PO qdaily, disp #90, [facility] [place] [facility]18[facility].  He was given a [employer] coupon card on 11-18-24.  - I discussed dietary modifications with him today and I recommended he drink mostly water during the day.  - I recommended he limit his fluid intake at night to small amounts of water and to void just prior to bedtime.   - I told him to notify the office if his symptoms worsen or persist.  - Keep follow up appointment on 11-17-25 with a PVR on arrival or follow up soon as needed.                          [1]   Social History  Socioeconomic History    Marital status:    Tobacco Use    Smoking status: Never    Smokeless tobacco: Never   [2]   Outpatient Encounter Medications as of 7/23/2025   Medication Sig Dispense Refill    omeprazole (PRILOSEC) 20 MG capsule Take 20 mg by mouth once daily.      REPATHA SURECLICK 140 mg/mL PnIj Inject 140 mg into the skin every 14 (fourteen) days.      tadalafiL (CIALIS) 5 MG tablet Take 1 tablet (5 mg total) by mouth once daily. 90  tablet 3    alfuzosin (UROXATRAL) 10 mg Tb24 Take 1 tablet (10 mg total) by mouth nightly. 90 tablet 1    ciprofloxacin HCl (CIPRO) 500 MG tablet Take 1 tablet (500 mg total) by mouth 2 (two) times daily. 42 tablet 0    HYDROcodone-acetaminophen (NORCO)  mg per tablet Take 1 tablet by mouth 3 (three) times daily as needed.      ipratropium (ATROVENT) 0.03 % nasal spray 2 sprays 2 (two) times daily.      rosuvastatin (CRESTOR) 40 MG Tab Take 10 mg by mouth every evening.      tadalafiL (CIALIS) 5 MG tablet Take 5 mg by mouth every evening.       No facility-administered encounter medications on file as of 7/23/2025.

## 2025-07-24 ENCOUNTER — PATIENT MESSAGE (OUTPATIENT)
Dept: UROLOGY | Facility: CLINIC | Age: 65
End: 2025-07-24
Payer: MEDICARE